# Patient Record
Sex: MALE | Race: WHITE | NOT HISPANIC OR LATINO | URBAN - METROPOLITAN AREA
[De-identification: names, ages, dates, MRNs, and addresses within clinical notes are randomized per-mention and may not be internally consistent; named-entity substitution may affect disease eponyms.]

---

## 2023-03-12 PROBLEM — E78.5 HYPERLIPEMIA: Status: ACTIVE | Noted: 2023-03-12

## 2023-03-12 PROBLEM — R35.0 URINARY FREQUENCY: Status: ACTIVE | Noted: 2023-03-12

## 2023-03-12 PROBLEM — R09.82 PND (POST-NASAL DRIP): Status: ACTIVE | Noted: 2023-03-12

## 2023-03-12 PROBLEM — F51.04 CHRONIC INSOMNIA: Status: ACTIVE | Noted: 2023-03-12

## 2023-03-12 PROBLEM — F41.1 GENERALIZED ANXIETY DISORDER: Status: ACTIVE | Noted: 2023-03-12

## 2023-03-12 PROBLEM — F32.9 MAJOR DEPRESSION: Status: ACTIVE | Noted: 2023-03-12

## 2023-03-12 PROBLEM — S43.439A SLAP SHOULDER TEAR: Status: ACTIVE | Noted: 2023-03-12

## 2023-03-12 PROBLEM — G47.33 OBSTRUCTIVE SLEEP APNEA: Status: ACTIVE | Noted: 2023-03-12

## 2023-03-12 PROBLEM — J06.9 URI (UPPER RESPIRATORY INFECTION): Status: ACTIVE | Noted: 2023-03-12

## 2023-03-12 PROBLEM — R03.0 ELEVATED BLOOD PRESSURE READING: Status: ACTIVE | Noted: 2023-03-12

## 2023-03-12 PROBLEM — R31.9 HEMATURIA: Status: ACTIVE | Noted: 2023-03-12

## 2023-03-12 RX ORDER — GABAPENTIN 300 MG/1
300 CAPSULE ORAL 3 TIMES DAILY
COMMUNITY
End: 2024-05-01 | Stop reason: ALTCHOICE

## 2023-03-12 RX ORDER — FINASTERIDE 1 MG/1
TABLET, FILM COATED ORAL
COMMUNITY

## 2023-03-12 RX ORDER — LORAZEPAM 1 MG/1
1 TABLET ORAL DAILY
COMMUNITY

## 2023-03-12 RX ORDER — ACETAMINOPHEN 500 MG
1 TABLET ORAL DAILY
COMMUNITY

## 2023-03-12 RX ORDER — MULTIVIT WITH MINERALS/HERBS
TABLET ORAL
COMMUNITY
End: 2024-05-01 | Stop reason: ALTCHOICE

## 2023-03-12 RX ORDER — CITALOPRAM 40 MG/1
1 TABLET, FILM COATED ORAL DAILY
COMMUNITY
Start: 2014-11-10

## 2023-03-23 ENCOUNTER — APPOINTMENT (OUTPATIENT)
Dept: PRIMARY CARE | Facility: CLINIC | Age: 63
End: 2023-03-23
Payer: COMMERCIAL

## 2023-03-30 ENCOUNTER — TELEPHONE (OUTPATIENT)
Dept: PRIMARY CARE | Facility: CLINIC | Age: 63
End: 2023-03-30
Payer: COMMERCIAL

## 2023-03-30 NOTE — TELEPHONE ENCOUNTER
Pt calling states you sent Adrenal Najera Kit and he wants to know if he should do the ASP schedule or the W-CAR schedule. Please advise

## 2023-05-05 ENCOUNTER — OFFICE VISIT (OUTPATIENT)
Dept: PRIMARY CARE | Facility: CLINIC | Age: 63
End: 2023-05-05
Payer: COMMERCIAL

## 2023-05-05 VITALS
WEIGHT: 203.8 LBS | DIASTOLIC BLOOD PRESSURE: 88 MMHG | HEART RATE: 88 BPM | BODY MASS INDEX: 29.18 KG/M2 | HEIGHT: 70 IN | OXYGEN SATURATION: 96 % | SYSTOLIC BLOOD PRESSURE: 124 MMHG

## 2023-05-05 DIAGNOSIS — F51.04 CHRONIC INSOMNIA: Primary | ICD-10-CM

## 2023-05-05 DIAGNOSIS — F41.1 GENERALIZED ANXIETY DISORDER: ICD-10-CM

## 2023-05-05 DIAGNOSIS — F32.1 CURRENT MODERATE EPISODE OF MAJOR DEPRESSIVE DISORDER, UNSPECIFIED WHETHER RECURRENT (MULTI): ICD-10-CM

## 2023-05-05 PROCEDURE — 99417 PROLNG OP E/M EACH 15 MIN: CPT | Performed by: FAMILY MEDICINE

## 2023-05-05 PROCEDURE — 99215 OFFICE O/P EST HI 40 MIN: CPT | Performed by: FAMILY MEDICINE

## 2023-05-05 RX ORDER — PAROXETINE HYDROCHLORIDE 20 MG/1
TABLET, FILM COATED ORAL
COMMUNITY
Start: 2023-04-27 | End: 2024-05-01 | Stop reason: ALTCHOICE
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
## 2024-05-01 ENCOUNTER — APPOINTMENT (OUTPATIENT)
Dept: LAB | Facility: LAB | Age: 64
End: 2024-05-01
Payer: COMMERCIAL

## 2024-05-01 ENCOUNTER — OFFICE VISIT (OUTPATIENT)
Dept: PRIMARY CARE | Facility: CLINIC | Age: 64
End: 2024-05-01
Payer: COMMERCIAL

## 2024-05-01 VITALS
OXYGEN SATURATION: 95 % | TEMPERATURE: 97.6 F | HEART RATE: 98 BPM | SYSTOLIC BLOOD PRESSURE: 110 MMHG | WEIGHT: 209 LBS | DIASTOLIC BLOOD PRESSURE: 80 MMHG | HEIGHT: 70 IN | BODY MASS INDEX: 29.92 KG/M2

## 2024-05-01 DIAGNOSIS — F41.1 GENERALIZED ANXIETY DISORDER: ICD-10-CM

## 2024-05-01 DIAGNOSIS — Z00.00 VISIT FOR PREVENTIVE HEALTH EXAMINATION: Primary | ICD-10-CM

## 2024-05-01 DIAGNOSIS — F32.1 CURRENT MODERATE EPISODE OF MAJOR DEPRESSIVE DISORDER, UNSPECIFIED WHETHER RECURRENT (MULTI): ICD-10-CM

## 2024-05-01 PROCEDURE — 1036F TOBACCO NON-USER: CPT | Performed by: FAMILY MEDICINE

## 2024-05-01 PROCEDURE — 99396 PREV VISIT EST AGE 40-64: CPT | Performed by: FAMILY MEDICINE

## 2024-05-01 RX ORDER — VENLAFAXINE HYDROCHLORIDE 150 MG/1
CAPSULE, EXTENDED RELEASE ORAL
COMMUNITY
Start: 2024-04-09

## 2024-05-01 RX ORDER — QUETIAPINE FUMARATE 50 MG/1
50 TABLET, FILM COATED ORAL NIGHTLY
COMMUNITY
Start: 2023-04-10

## 2024-05-01 RX ORDER — BISMUTH SUBSALICYLATE 262 MG
1 TABLET,CHEWABLE ORAL DAILY
COMMUNITY

## 2024-05-01 RX ORDER — LAMOTRIGINE 25 MG/1
25 TABLET ORAL
COMMUNITY

## 2024-05-01 RX ORDER — GABAPENTIN 400 MG/1
400 CAPSULE ORAL 3 TIMES DAILY
COMMUNITY

## 2024-05-01 NOTE — PROGRESS NOTES
Subjective   Patient ID: Sven Nava is a 63 y.o. male who presents for Annual Exam ([facility]).    Assessment/Plan     Problem List Items Addressed This Visit    None  Discussed about diet exercise  Discussed about age-related immunization  Discussed about fasting lab work  Follow-up every year for physical  Follow-up in a year come back early with any new signs and symptoms  Colon cancer screening colonoscopy information provided  PSA check today    Patient understood and agreed with plan.    HPI    63-year-old male here for physical    Chronic depression anxiety was admitted to inpatient hospital    Chronic insomnia      Father side family history of depression  Father was alcoholic    Quit smoking in 2008  Patient does not drink alcohol  No routine exercise    No new complaints    No Known Allergies    Current Outpatient Medications   Medication Sig Dispense Refill    cholecalciferol (Vitamin D-3) 50 mcg (2,000 unit) capsule Take 1 capsule (50 mcg) by mouth once daily.      finasteride (Propecia) 1 mg tablet Take by mouth.      gabapentin (Neurontin) 400 mg capsule Take 1 capsule (400 mg) by mouth 3 times a day.      multivitamin tablet Take 1 tablet by mouth once daily.      omega-3/dha/epa/fish oil (OMEGA-3 ORAL) Take by mouth. CAPS: Vegan formula      QUEtiapine (SEROquel) 50 mg tablet Take 1 tablet (50 mg) by mouth once daily at bedtime.      venlafaxine XR (Effexor-XR) 150 mg 24 hr capsule       citalopram (CeleXA) 40 mg tablet Take 1 tablet (40 mg) by mouth once daily.      LORazepam (Ativan) 1 mg tablet Take 1 tablet (1 mg) by mouth once daily.       No current facility-administered medications for this visit.       Objective   Visit Vitals  Smoking Status Former     Physical Exam  Constitutional   General appearance: Alert and in no acute distress.   Head and Face   Head and face: Normal.     Palpation of the face and sinuses: Normal.    Eyes   Inspection of eyes: Sclera and conjunctiva were  normal.    Pupil exam: Pupils were equal in size. Extraocular movements were intact.   Ears, Nose, Mouth, and Throat   Ears: Auricles: Normal.    Otoscopic examination: Tympanic membranes: Normal with no congestion and no discharge. Otic Canals: Normal without tenderness, congestion or discharge.    Hearing: Normal.     Nasal mucosa, septum, and turbinates: Normal without edema or erythema.    Lips, teeth, and gums: Normal.    Oropharynx: Normal with moist mucus membranes, no congestion. Tonsils: Normal no follicles.   Neck   Neck Exam: Appearance of the neck was normal. No neck masses observed.    Thyroid exam: Not enlarged and no palpable thyroid nodules.   Pulmonary   Respiratory assessment: No respiratory distress, normal respiratory rhythm and effort.    Auscultation of Lungs: Clear bilateral breath sounds.   Cardiovascular   Auscultation of heart: Apical pulse normal, heart rate and rhythm normal, normal S1 and S2, no murmurs and no pericardial rub.    Carotid arteries: Pulses normal with no bruits.    Exam for edema: No peripheral edema.   Chest   Chest: Normal A_P diameter, no pulsation, no intercostal withdrawing. Trachea central.   Abdomen   Abdominal Exam: No bruits, normal bowel sounds, soft, non-tender, no abdominal mass palpated.    Liver and Spleen exam: No hepato-splenomegaly.    Examination for hernias: Normal.    Lymphatic   Palpation of lymph nodes in neck: No cervical lymphadenopathy.   Musculoskeletal   Examination of gait: Normal.    Inspection of digits and nails: No clubbing or cyanosis of the fingernails.    Inspection/palpation of joints, bones and muscles: No joint swelling. Normal movement of all extremities.    Range of Motion: Normal movement of all extremities.   Skin   Skin inspection: Normal skin color and pigmentation, normal skin turgor and no visible rash.   Neurologic   Cranial nerves: Nerves 2-12 were intact, no focal neuro defects.    Reflexes: Normal.     Sensation: Normal.      Coordination: Normal.    Psychiatric   Judgment and insight: Intact.    Orientation: Oriented to person, place, and time.    Recent and remote memory: Normal.     Mood and affect: Normal.     Genitourinary Declined.    Immunization History   Administered Date(s) Administered    Influenza, seasonal, injectable 10/14/2022    Moderna SARS-CoV-2 Vaccination 03/19/2021, 04/16/2021, 12/13/2021, 04/19/2022       Review of Systems    No visits with results within 4 Month(s) from this visit.   Latest known visit with results is:   Legacy Encounter on 05/21/2021   Component Date Value Ref Range Status    DRUG SCREEN COMMENT URINE 05/21/2021 SEE BELOW   Final    Amphetamine Screen, Urine 05/21/2021 PRESUMPTIVE NEGATIVE  NEGATIVE Final    Barbiturate Screen, Urine 05/21/2021 PRESUMPTIVE NEGATIVE  NEGATIVE Final    BENZODIAZEPINE (PRESENCE) IN URINE* 05/21/2021 PRESUMPTIVE NEGATIVE  NEGATIVE Final    Cannabinoid Screen, Urine 05/21/2021 PRESUMPTIVE NEGATIVE  NEGATIVE Final    Cocaine Screen, Urine 05/21/2021 PRESUMPTIVE NEGATIVE  NEGATIVE Final    Fentanyl, Ur 05/21/2021 PRESUMPTIVE NEGATIVE  NEGATIVE Final    Methadone Screen, Urine 05/21/2021 PRESUMPTIVE NEGATIVE  NEGATIVE Final    Opiate Screen, Urine 05/21/2021 PRESUMPTIVE NEGATIVE  NEGATIVE Final    Oxycodone Screen, Ur 05/21/2021 PRESUMPTIVE NEGATIVE  NEGATIVE Final    PCP Screen, Urine 05/21/2021 PRESUMPTIVE NEGATIVE  NEGATIVE Final       Radiology: Reviewed imaging in powerchart.  No results found.    Family History   Problem Relation Name Age of Onset    Anxiety disorder Mother      Depression Mother      Anxiety disorder Father      Aortic aneurysm Father      Depression Father      Anxiety disorder Sister      Depression Sister      Anxiety disorder Brother      Depression Brother      Alcohol abuse Maternal Grandmother       Social History     Socioeconomic History    Marital status:      Spouse name: None    Number of children: None    Years of education:  None    Highest education level: None   Occupational History    None   Tobacco Use    Smoking status: Former     Types: Cigarettes    Smokeless tobacco: Never    Tobacco comments:     Social smoker for 20yrs 2008 quit   Substance and Sexual Activity    Alcohol use: Never     Comment: 2008 stopped drinking    Drug use: Never    Sexual activity: None   Other Topics Concern    None   Social History Narrative    None     Social Determinants of Health     Financial Resource Strain: Not on File (12/8/2022)    Received from [facility]     Financial Resource Strain     Financial Resource Strain: 0   Food Insecurity: Not on File (12/8/2022)    Received from [facility]     Food Insecurity     Food: 0   Transportation Needs: Not on File (12/8/2022)    Received from [facility]     Transportation Needs     Transportation: 0   Physical Activity: Not on File (12/8/2022)    Received from [facility]     Physical Activity     Physical Activity: 0   Recent Concern: Physical Activity - Insufficiently Active (11/28/2022)    Received from [facility]    Exercise Vital Sign     Days of Exercise per Week: 1 day     Minutes of Exercise per Session: 30 min   Stress: Not on File (12/8/2022)    Received from [facility]     Stress     Stress: 0   Recent Concern: Stress - Stress Concern Present (11/28/2022)    Received from [facility]    [employer] [place] [employer] - Occupational Stress Questionnaire     Feeling of Stress : Rather much   Social Connections: Not on File (12/8/2022)    Received from [facility]     Social Connections     Social Connections and Isolation: 0   Recent Concern: Social Connections - Moderately Isolated (11/28/2022)    Received from [facility]    Social Connection and Isolation Panel [NHANES]     Frequency of Communication with Friends and Family: Three times a week     Frequency of Social Gatherings with Friends and Family: Once a week     Attends [employer] Services: Never     Active Member of Clubs or Organizations: No     Attends  Club or Organization Meetings: Never     Marital Status:    Intimate Partner Violence: Not At Risk (2022)    Received from [facility]    Humiliation, Afraid, Rape, and Kick questionnaire     Fear of Current or Ex-Partner: No     Emotionally Abused: No     Physically Abused: No     Sexually Abused: No   Housing Stability: Not on File (2022)    Received from [facility]     Housing Stability     [place]     Past Medical History:   Diagnosis Date    Acute upper respiratory infection, unspecified 2020    URI (upper respiratory infection)    Alcohol abuse, uncomplicated     Alcohol abuse    Elevated blood-pressure reading, without diagnosis of hypertension 2021    Elevated blood pressure reading    Encounter for other administrative examinations 2021    Medication management contract agreement    Encounter for screening for malignant neoplasm of colon 2020    Colon cancer screening    Hyperlipidemia, unspecified 2020    Hyperlipemia    Obstructive sleep apnea (adult) (pediatric) 2021    Obstructive sleep apnea    Other long term (current) drug therapy 2021    Medication management    Superior glenoid labrum lesion of unspecified shoulder, initial encounter 10/22/2020    SLAP shoulder tear     History reviewed. No pertinent surgical history.    Charting was completed using voice recognition technology and may include unintended errors.

## 2024-06-12 ENCOUNTER — APPOINTMENT (OUTPATIENT)
Dept: LAB | Facility: LAB | Age: 64
End: 2024-06-12
Payer: COMMERCIAL

## 2024-06-12 ENCOUNTER — LAB (OUTPATIENT)
Dept: LAB | Facility: LAB | Age: 64
End: 2024-06-12
Payer: COMMERCIAL

## 2024-06-12 DIAGNOSIS — Z00.00 VISIT FOR PREVENTIVE HEALTH EXAMINATION: ICD-10-CM

## 2024-06-12 LAB
ALBUMIN SERPL BCP-MCNC: 4.3 G/DL (ref 3.4–5)
ALP SERPL-CCNC: 88 U/L (ref 33–136)
ALT SERPL W P-5'-P-CCNC: 22 U/L (ref 10–52)
ANION GAP SERPL CALC-SCNC: 13 MMOL/L (ref 10–20)
APPEARANCE UR: CLEAR
AST SERPL W P-5'-P-CCNC: 16 U/L (ref 9–39)
BILIRUB SERPL-MCNC: 0.4 MG/DL (ref 0–1.2)
BILIRUB UR STRIP.AUTO-MCNC: NEGATIVE MG/DL
BUN SERPL-MCNC: 11 MG/DL (ref 6–23)
CALCIUM SERPL-MCNC: 9.1 MG/DL (ref 8.6–10.6)
CHLORIDE SERPL-SCNC: 106 MMOL/L (ref 98–107)
CHOLEST SERPL-MCNC: 144 MG/DL (ref 0–199)
CHOLESTEROL/HDL RATIO: 3.2
CO2 SERPL-SCNC: 29 MMOL/L (ref 21–32)
COLOR UR: YELLOW
CREAT SERPL-MCNC: 1.13 MG/DL (ref 0.5–1.3)
EGFRCR SERPLBLD CKD-EPI 2021: 74 ML/MIN/1.73M*2
ERYTHROCYTE [DISTWIDTH] IN BLOOD BY AUTOMATED COUNT: 11.7 % (ref 11.5–14.5)
GLUCOSE SERPL-MCNC: 96 MG/DL (ref 74–99)
GLUCOSE UR STRIP.AUTO-MCNC: NORMAL MG/DL
HCT VFR BLD AUTO: 49.3 % (ref 41–52)
HDLC SERPL-MCNC: 44.6 MG/DL
HGB BLD-MCNC: 16.8 G/DL (ref 13.5–17.5)
KETONES UR STRIP.AUTO-MCNC: NEGATIVE MG/DL
LDLC SERPL CALC-MCNC: 64 MG/DL
LEUKOCYTE ESTERASE UR QL STRIP.AUTO: NEGATIVE
MCH RBC QN AUTO: 32.3 PG (ref 26–34)
MCHC RBC AUTO-ENTMCNC: 34.1 G/DL (ref 32–36)
MCV RBC AUTO: 95 FL (ref 80–100)
MUCOUS THREADS #/AREA URNS AUTO: NORMAL /LPF
NITRITE UR QL STRIP.AUTO: NEGATIVE
NON HDL CHOLESTEROL: 99 MG/DL (ref 0–149)
NRBC BLD-RTO: 0 /100 WBCS (ref 0–0)
PH UR STRIP.AUTO: 5.5 [PH]
PLATELET # BLD AUTO: 238 X10*3/UL (ref 150–450)
POTASSIUM SERPL-SCNC: 3.9 MMOL/L (ref 3.5–5.3)
PROT SERPL-MCNC: 6.3 G/DL (ref 6.4–8.2)
PROT UR STRIP.AUTO-MCNC: NORMAL MG/DL
PSA SERPL-MCNC: 0.28 NG/ML
RBC # BLD AUTO: 5.2 X10*6/UL (ref 4.5–5.9)
RBC # UR STRIP.AUTO: NEGATIVE /UL
RBC #/AREA URNS AUTO: NORMAL /HPF
SODIUM SERPL-SCNC: 144 MMOL/L (ref 136–145)
SP GR UR STRIP.AUTO: 1.03
T4 FREE SERPL-MCNC: 0.72 NG/DL (ref 0.78–1.48)
TRIGL SERPL-MCNC: 175 MG/DL (ref 0–149)
TSH SERPL-ACNC: 4.73 MIU/L (ref 0.44–3.98)
URATE CRY #/AREA UR COMP ASSIST: NORMAL /HPF
UROBILINOGEN UR STRIP.AUTO-MCNC: NORMAL MG/DL
VLDL: 35 MG/DL (ref 0–40)
WBC # BLD AUTO: 4.2 X10*3/UL (ref 4.4–11.3)
WBC #/AREA URNS AUTO: NORMAL /HPF

## 2024-06-12 PROCEDURE — 81001 URINALYSIS AUTO W/SCOPE: CPT

## 2024-06-12 PROCEDURE — 84439 ASSAY OF FREE THYROXINE: CPT

## 2024-06-12 PROCEDURE — 84443 ASSAY THYROID STIM HORMONE: CPT

## 2024-06-12 PROCEDURE — 84153 ASSAY OF PSA TOTAL: CPT

## 2024-06-12 PROCEDURE — 36415 COLL VENOUS BLD VENIPUNCTURE: CPT

## 2024-06-12 PROCEDURE — 80061 LIPID PANEL: CPT

## 2024-06-12 PROCEDURE — 85027 COMPLETE CBC AUTOMATED: CPT

## 2024-06-12 PROCEDURE — 80053 COMPREHEN METABOLIC PANEL: CPT

## 2024-06-24 ENCOUNTER — TELEPHONE (OUTPATIENT)
Dept: SCHEDULING | Age: 64
End: 2024-06-24
Payer: COMMERCIAL

## 2024-06-25 NOTE — TELEPHONE ENCOUNTER
Please inform patient that lab work within normal limits.  As long as patient can see normal lab work we do not call.  We only call with abnormal results where we need to change any management plan.  And please give him GI information for Dr. Meng

## 2024-07-11 ENCOUNTER — APPOINTMENT (OUTPATIENT)
Dept: BEHAVIORAL HEALTH | Facility: CLINIC | Age: 64
End: 2024-07-11
Payer: COMMERCIAL

## 2024-07-11 VITALS
WEIGHT: 199 LBS | HEIGHT: 71 IN | HEART RATE: 92 BPM | DIASTOLIC BLOOD PRESSURE: 87 MMHG | SYSTOLIC BLOOD PRESSURE: 127 MMHG | BODY MASS INDEX: 27.86 KG/M2

## 2024-07-11 DIAGNOSIS — F32.1 CURRENT MODERATE EPISODE OF MAJOR DEPRESSIVE DISORDER, UNSPECIFIED WHETHER RECURRENT (MULTI): ICD-10-CM

## 2024-07-11 DIAGNOSIS — F41.1 GENERALIZED ANXIETY DISORDER: ICD-10-CM

## 2024-07-11 PROCEDURE — 90792 PSYCH DIAG EVAL W/MED SRVCS: CPT | Performed by: PSYCHIATRY & NEUROLOGY

## 2024-07-11 ASSESSMENT — ENCOUNTER SYMPTOMS
DIARRHEA: 0
SLEEP DISTURBANCE: 1
AGITATION: 0
CONSTIPATION: 1
DECREASED CONCENTRATION: 0
NERVOUS/ANXIOUS: 1
HALLUCINATIONS: 0
NAUSEA: 0
HYPERACTIVE: 0
VOMITING: 0
SEIZURES: 0
DYSPHORIC MOOD: 0
APPETITE CHANGE: 0
FATIGUE: 0
CONFUSION: 0
PALPITATIONS: 0
TREMORS: 0

## 2024-07-11 NOTE — PROGRESS NOTES
"Subjective   Patient ID: Sven Nava is a 63 y.o. male who presents for second opinion  Diagnosed with OK since 1995   Hospitalized in 2008 for severe anxiety and sleep issues on Celexa 40 mg until his most recent hospitalization in November 2022  Few months ago, diagnosis was switched to bipolar disorder. Currently on 100 mg of Lamictal since 6/18/2024, Seroquel was reduced from 300 to 150 with the intent to discontinue   Still on Venlafaxine 150 and Gabapentin 400 mg for anxiety   Also on Lorazepam 1 mg po at bedtime prn, does not take is as much   In therapy now   Episodes of impulsive behavior, what he described as \" grandiosity\", chronic lack of sleep and attention. Maintained the same job and marriage for 30 years or so.   History of excessive alcohol use until 2008        Current Outpatient Medications on File Prior to Visit   Medication Sig Dispense Refill    cholecalciferol (Vitamin D-3) 50 mcg (2,000 unit) capsule Take 1 capsule (50 mcg) by mouth once daily.      finasteride (Propecia) 1 mg tablet Take by mouth.      gabapentin (Neurontin) 400 mg capsule Take 1 capsule (400 mg) by mouth 3 times a day.      lamoTRIgine (LaMICtal) 25 mg tablet Take 4 tablets (100 mg) by mouth every other day. 1 tab for 14days then 2 tabs for 14 days      LORazepam (Ativan) 1 mg tablet Take 1 tablet (1 mg) by mouth once daily as needed for anxiety.      multivitamin tablet Take 1 tablet by mouth once daily.      omega-3/dha/epa/fish oil (OMEGA-3 ORAL) Take by mouth. CAPS: Vegan formula      QUEtiapine (SEROquel) 50 mg tablet Take 3 tablets (150 mg) by mouth once daily at bedtime.      venlafaxine XR (Effexor-XR) 150 mg 24 hr capsule       citalopram (CeleXA) 40 mg tablet Take 1 tablet (40 mg) by mouth once daily.       No current facility-administered medications on file prior to visit.      Patient Active Problem List   Diagnosis    Elevated blood pressure reading    Generalized anxiety disorder    Hyperlipemia    " Obstructive sleep apnea    PND (post-nasal drip)    SLAP shoulder tear    URI (upper respiratory infection)    Chronic insomnia    Hematuria    Major depression    Urinary frequency    Visit for preventive health examination        Past Psychiatric History:  Gabapentin, Seroquel, Celexa, Venlafaxine, Xanax   2 inpatient admissions   No history of suicide attempt of suicide ideation   Substance Abuse History:  History of excessive use of alcoholism, quit in 2008   Family History:  Family History   Problem Relation Name Age of Onset    Anxiety disorder Mother      Depression Mother      Anxiety disorder Father      Aortic aneurysm Father      Depression Father      Anxiety disorder Sister      Depression Sister      Anxiety disorder Brother      Depression Brother      Alcohol abuse Maternal Grandmother      Father`s side there is history of depression   Great uncle had schizophrenia   Social History:  Social History     Socioeconomic History    Marital status:      Spouse name: Not on file    Number of children: Not on file    Years of education: Not on file    Highest education level: Not on file   Occupational History    Not on file   Tobacco Use    Smoking status: Former     Types: Cigarettes    Smokeless tobacco: Never    Tobacco comments:     Social smoker for 20yrs 2008 quit   Substance and Sexual Activity    Alcohol use: Never     Comment: 2008 stopped drinking    Drug use: Never    Sexual activity: Not on file   Other Topics Concern    Not on file   Social History Narrative    Not on file     Social Determinants of Health     Financial Resource Strain: Not on File (12/8/2022)    Received from [facility]    Financial Resource Strain     Financial Resource Strain: 0   Food Insecurity: Not on File (12/8/2022)    Received from [facility]    Food Insecurity     Food: 0   Transportation Needs: Not on File (12/8/2022)    Received from [facility]    Transportation Needs     Transportation: 0   Physical  Activity: Not on File (2022)    Received from [facility]    Physical Activity     Physical Activity: 0   Recent Concern: Physical Activity - Insufficiently Active (2022)    Received from [employer]    Exercise Vital Sign     Days of Exercise per Week: 1 day     Minutes of Exercise per Session: 30 min   Stress: Not on File (2022)    Received from [facility]    Stress     Stress: 0   Recent Concern: Stress - Stress Concern Present (2022)    Received from [employer]    [facility] of Occupational Health - Occupational Stress Questionnaire     Feeling of Stress : Rather much   Social Connections: Not on File (2022)    Received from [facility]    Social Connections     Social Connections and Isolation: 0   Recent Concern: Social Connections - Moderately Isolated (2022)    Received from [employer]    Social Connection and Isolation Panel [NHANES]     Frequency of Communication with Friends and Family: Three times a week     Frequency of Social Gatherings with Friends and Family: Once a week     Attends [employer] Services: Never     Active Member of Clubs or Organizations: No     Attends Club or Organization Meetings: Never     Marital Status:    Intimate Partner Violence: Not At Risk (2022)    Received from [employer]    Humiliation, Afraid, Rape, and Kick questionnaire     Fear of Current or Ex-Partner: No     Emotionally Abused: No     Physically Abused: No     Sexually Abused: No   Housing Stability: Not on File (2022)    Received from [facility]    Housing Stability     [place]          Born here, raised by strict father  [employer]   4 children    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   Cardiovascular:  Negative for chest pain and palpitations.   Gastrointestinal:  Positive for constipation. Negative for  diarrhea, nausea and vomiting.   Musculoskeletal:  Negative for gait problem.   Skin:  Negative for rash.   Neurological:  Negative for tremors and seizures.   Psychiatric/Behavioral:  Positive for sleep disturbance. Negative for agitation, behavioral problems, confusion, decreased concentration, dysphoric mood, hallucinations, self-injury and suicidal ideas. The patient is nervous/anxious. The patient is not hyperactive.        Objective   There were no vitals filed for this visit.   Physical Exam  Psychiatric:         Attention and Perception: Attention normal.         Mood and Affect: Mood and affect normal.         Speech: Speech normal.         Behavior: Behavior normal. Behavior is cooperative.         Thought Content: Thought content normal.         Cognition and Memory: Cognition and memory normal.         Lab Review:   Lab on 06/12/2024   Component Date Value    Prostate Specific Antige* 06/12/2024 0.28     Thyroid Stimulating Horm* 06/12/2024 4.73 (H)     Cholesterol 06/12/2024 144     HDL-Cholesterol 06/12/2024 44.6     Cholesterol/HDL Ratio 06/12/2024 3.2     LDL Calculated 06/12/2024 64     VLDL 06/12/2024 35     Triglycerides 06/12/2024 175 (H)     Non HDL Cholesterol 06/12/2024 99     Glucose 06/12/2024 96     Sodium 06/12/2024 144     Potassium 06/12/2024 3.9     Chloride 06/12/2024 106     Bicarbonate 06/12/2024 29     Anion Gap 06/12/2024 13     Urea Nitrogen 06/12/2024 11     Creatinine 06/12/2024 1.13     eGFR 06/12/2024 74     Calcium 06/12/2024 9.1     Albumin 06/12/2024 4.3     Alkaline Phosphatase 06/12/2024 88     Total Protein 06/12/2024 6.3 (L)     AST 06/12/2024 16     Bilirubin, Total 06/12/2024 0.4     ALT 06/12/2024 22     WBC 06/12/2024 4.2 (L)     nRBC 06/12/2024 0.0     RBC 06/12/2024 5.20     Hemoglobin 06/12/2024 16.8     Hematocrit 06/12/2024 49.3     MCV 06/12/2024 95     MCH 06/12/2024 32.3     MCHC 06/12/2024 34.1     RDW 06/12/2024 11.7     Platelets 06/12/2024 238     Color,  Urine 06/12/2024 Yellow     Appearance, Urine 06/12/2024 Clear     Specific Gravity, Urine 06/12/2024 1.026     pH, Urine 06/12/2024 5.5     Protein, Urine 06/12/2024 10 (TRACE)     Glucose, Urine 06/12/2024 Normal     Blood, Urine 06/12/2024 NEGATIVE     Ketones, Urine 06/12/2024 NEGATIVE     Bilirubin, Urine 06/12/2024 NEGATIVE     Urobilinogen, Urine 06/12/2024 Normal     Nitrite, Urine 06/12/2024 NEGATIVE     Leukocyte Esterase, Urine 06/12/2024 NEGATIVE     Thyroxine, Free 06/12/2024 0.72 (L)     WBC, Urine 06/12/2024 1-5     RBC, Urine 06/12/2024 3-5     Mucus, Urine 06/12/2024 FEW     Uric Acid Crystals, Urine 06/12/2024 1+      Lab Results   Component Value Date     06/12/2024     09/21/2020    K 3.9 06/12/2024    K 4.4 09/21/2020    CO2 29 06/12/2024    CO2 29 09/21/2020    BUN 11 06/12/2024    BUN 14 09/21/2020    CREATININE 1.13 06/12/2024    CREATININE 0.98 09/21/2020    GLUCOSE 96 06/12/2024    GLUCOSE 87 09/21/2020    CALCIUM 9.1 06/12/2024    CALCIUM 9.5 09/21/2020     Lab Results   Component Value Date    WBC 4.2 (L) 06/12/2024    HGB 16.8 06/12/2024    HCT 49.3 06/12/2024    MCV 95 06/12/2024     06/12/2024     Lab Results   Component Value Date    CHOL 144 06/12/2024    TRIG 175 (H) 06/12/2024    HDL 44.6 06/12/2024       Assessment/Plan   Problem List Items Addressed This Visit       Generalized anxiety disorder    Major depression     R/o bipolar d/o   Would continue Venlafaxine and Lamictal  Would stop Ativan and Gabapentin   Continue to taper Seroquel and eventually discontinue   Mood charting   Therapy   Discussed the diagnoses, discussed meds benefits, alternatives and side effects     Dejah Palma MD

## 2025-01-10 ENCOUNTER — APPOINTMENT (OUTPATIENT)
Dept: PRIMARY CARE | Facility: CLINIC | Age: 65
End: 2025-01-10
Payer: COMMERCIAL

## 2025-01-10 VITALS
HEART RATE: 96 BPM | HEIGHT: 71 IN | WEIGHT: 203 LBS | OXYGEN SATURATION: 96 % | BODY MASS INDEX: 28.42 KG/M2 | SYSTOLIC BLOOD PRESSURE: 130 MMHG | DIASTOLIC BLOOD PRESSURE: 90 MMHG | TEMPERATURE: 97.9 F

## 2025-01-10 DIAGNOSIS — R22.1 LOCALIZED SWELLING, MASS AND LUMP, NECK: Primary | ICD-10-CM

## 2025-01-10 PROCEDURE — 99213 OFFICE O/P EST LOW 20 MIN: CPT | Performed by: FAMILY MEDICINE

## 2025-01-10 PROCEDURE — 1036F TOBACCO NON-USER: CPT | Performed by: FAMILY MEDICINE

## 2025-01-10 PROCEDURE — 3008F BODY MASS INDEX DOCD: CPT | Performed by: FAMILY MEDICINE

## 2025-01-10 RX ORDER — LAMOTRIGINE 100 MG/1
150 TABLET ORAL
COMMUNITY
Start: 2024-08-12

## 2025-01-10 NOTE — PROGRESS NOTES
Subjective   Patient ID: Sven Nava is a 64 y.o. male who presents for No chief complaint on file..    Assessment/Plan     Problem List Items Addressed This Visit    None  Discussed about diet exercise  Discussed about age-related immunization  Discussed about fasting lab work  Follow-up every year for physical  Follow-up in a year come back early with any new signs and symptoms  Colon cancer screening colonoscopy information provided  PSA check today    Patient understood and agreed with plan.    HPI    64-year-old male here for complaining of lump on right posterolateral aspect of neck  Nontender noticed probably few days to couple of weeks ago by chiropractor  Advised to see GP  Currently nontender  Overlying skin within normal limits 3 to 4 cm diameter could be severe muscle spasm as  patient have severe anxiety with neck spasm      Previous visit  Chronic depression anxiety was admitted to inpatient hospital    Chronic insomnia      Father side family history of depression  Father was alcoholic    Quit smoking in 2008  Patient does not drink alcohol  No routine exercise    No new complaints    No Known Allergies    Current Outpatient Medications   Medication Sig Dispense Refill    cholecalciferol (Vitamin D-3) 50 mcg (2,000 unit) capsule Take 1 capsule (50 mcg) by mouth once daily.      citalopram (CeleXA) 40 mg tablet Take 1 tablet (40 mg) by mouth once daily.      finasteride (Propecia) 1 mg tablet Take by mouth.      gabapentin (Neurontin) 400 mg capsule Take 1 capsule (400 mg) by mouth 3 times a day.      lamoTRIgine (LaMICtal) 25 mg tablet Take 4 tablets (100 mg) by mouth every other day. 1 tab for 14days then 2 tabs for 14 days      LORazepam (Ativan) 1 mg tablet Take 1 tablet (1 mg) by mouth once daily as needed for anxiety.      multivitamin tablet Take 1 tablet by mouth once daily.      omega-3/dha/epa/fish oil (OMEGA-3 ORAL) Take by mouth. CAPS: Vegan formula      QUEtiapine (SEROquel) 50 mg  tablet Take 3 tablets (150 mg) by mouth once daily at bedtime.      venlafaxine XR (Effexor-XR) 150 mg 24 hr capsule        No current facility-administered medications for this visit.       Objective   Visit Vitals  Smoking Status Former     Physical Exam  Constitutional   General appearance: Alert and in no acute distress.   Head and Face   Head and face: Normal.     Palpation of the face and sinuses: Normal.    Eyes   Inspection of eyes: Sclera and conjunctiva were normal.    Pupil exam: Pupils were equal in size. Extraocular movements were intact.   Ears, Nose, Mouth, and Throat   Ears: Auricles: Normal.    Otoscopic examination: Tympanic membranes: Normal with no congestion and no discharge. Otic Canals: Normal without tenderness, congestion or discharge.    Hearing: Normal.     Nasal mucosa, septum, and turbinates: Normal without edema or erythema.    Lips, teeth, and gums: Normal.    Oropharynx: Normal with moist mucus membranes, no congestion. Tonsils: Normal no follicles.   Neck   Neck Exam: Appearance of the neck was normal. No neck masses observed.    Thyroid exam: Not enlarged and no palpable thyroid nodules.   Pulmonary   Respiratory assessment: No respiratory distress, normal respiratory rhythm and effort.    Auscultation of Lungs: Clear bilateral breath sounds.   Cardiovascular   Auscultation of heart: Apical pulse normal, heart rate and rhythm normal, normal S1 and S2, no murmurs and no pericardial rub.    Carotid arteries: Pulses normal with no bruits.    Exam for edema: No peripheral edema.   Chest   Chest: Normal A_P diameter, no pulsation, no intercostal withdrawing. Trachea central.   Abdomen   Abdominal Exam: No bruits, normal bowel sounds, soft, non-tender, no abdominal mass palpated.    Liver and Spleen exam: No hepato-splenomegaly.    Examination for hernias: Normal.    Lymphatic   Palpation of lymph nodes in neck: No cervical lymphadenopathy.   Musculoskeletal   Examination of gait: Normal.     Inspection of digits and nails: No clubbing or cyanosis of the fingernails.    Inspection/palpation of joints, bones and muscles: No joint swelling. Normal movement of all extremities.    Range of Motion: Normal movement of all extremities.   Skin   Skin inspection: Normal skin color and pigmentation, normal skin turgor and no visible rash.   Neurologic   Cranial nerves: Nerves 2-12 were intact, no focal neuro defects.    Reflexes: Normal.     Sensation: Normal.     Coordination: Normal.    Psychiatric   Judgment and insight: Intact.    Orientation: Oriented to person, place, and time.    Recent and remote memory: Normal.     Mood and affect: Normal.     Genitourinary Declined.    Immunization History   Administered Date(s) Administered    Flu vaccine (IIV4), preservative free *Check age/dose* 12/26/2016, 10/14/2022    Flu vaccine, trivalent, preservative free, no egg protein, age 6 months or greater (Flucelvax) 01/19/2016    Influenza, Unspecified 01/17/2013    Influenza, injectable, quadrivalent 11/27/2017    Influenza, seasonal, injectable 10/14/2022    Moderna SARS-CoV-2 Vaccination 03/19/2021, 04/16/2021, 12/13/2021, 04/19/2022    Tdap vaccine, age 7 year and older (BOOSTRIX, ADACEL) 03/17/2010       Review of Systems    No visits with results within 4 Month(s) from this visit.   Latest known visit with results is:   Lab on 06/12/2024   Component Date Value Ref Range Status    Prostate Specific Antigen,Screen 06/12/2024 0.28  <=4.00 ng/mL Final    Thyroid Stimulating Hormone 06/12/2024 4.73 (H)  0.44 - 3.98 mIU/L Final    Cholesterol 06/12/2024 144  0 - 199 mg/dL Final    HDL-Cholesterol 06/12/2024 44.6  mg/dL Final    Cholesterol/HDL Ratio 06/12/2024 3.2   Final    LDL Calculated 06/12/2024 64  <=99 mg/dL Final    VLDL 06/12/2024 35  0 - 40 mg/dL Final    Triglycerides 06/12/2024 175 (H)  0 - 149 mg/dL Final    Non HDL Cholesterol 06/12/2024 99  0 - 149 mg/dL Final    Glucose 06/12/2024 96  74 - 99 mg/dL  Final    Sodium 06/12/2024 144  136 - 145 mmol/L Final    Potassium 06/12/2024 3.9  3.5 - 5.3 mmol/L Final    Chloride 06/12/2024 106  98 - 107 mmol/L Final    Bicarbonate 06/12/2024 29  21 - 32 mmol/L Final    Anion Gap 06/12/2024 13  10 - 20 mmol/L Final    Urea Nitrogen 06/12/2024 11  6 - 23 mg/dL Final    Creatinine 06/12/2024 1.13  0.50 - 1.30 mg/dL Final    eGFR 06/12/2024 74  >60 mL/min/1.73m*2 Final    Calcium 06/12/2024 9.1  8.6 - 10.6 mg/dL Final    Albumin 06/12/2024 4.3  3.4 - 5.0 g/dL Final    Alkaline Phosphatase 06/12/2024 88  33 - 136 U/L Final    Total Protein 06/12/2024 6.3 (L)  6.4 - 8.2 g/dL Final    AST 06/12/2024 16  9 - 39 U/L Final    Bilirubin, Total 06/12/2024 0.4  0.0 - 1.2 mg/dL Final    ALT 06/12/2024 22  10 - 52 U/L Final    WBC 06/12/2024 4.2 (L)  4.4 - 11.3 x10*3/uL Final    nRBC 06/12/2024 0.0  0.0 - 0.0 /100 WBCs Final    RBC 06/12/2024 5.20  4.50 - 5.90 x10*6/uL Final    Hemoglobin 06/12/2024 16.8  13.5 - 17.5 g/dL Final    Hematocrit 06/12/2024 49.3  41.0 - 52.0 % Final    MCV 06/12/2024 95  80 - 100 fL Final    MCH 06/12/2024 32.3  26.0 - 34.0 pg Final    MCHC 06/12/2024 34.1  32.0 - 36.0 g/dL Final    RDW 06/12/2024 11.7  11.5 - 14.5 % Final    Platelets 06/12/2024 238  150 - 450 x10*3/uL Final    Color, Urine 06/12/2024 Yellow  Light-Yellow, Yellow, Dark-Yellow Final    Appearance, Urine 06/12/2024 Clear  Clear Final    Specific Gravity, Urine 06/12/2024 1.026  1.005 - 1.035 Final    pH, Urine 06/12/2024 5.5  5.0, 5.5, 6.0, 6.5, 7.0, 7.5, 8.0 Final    Protein, Urine 06/12/2024 10 (TRACE)  NEGATIVE, 10 (TRACE), 20 (TRACE) mg/dL Final    Glucose, Urine 06/12/2024 Normal  Normal mg/dL Final    Blood, Urine 06/12/2024 NEGATIVE  NEGATIVE Final    Ketones, Urine 06/12/2024 NEGATIVE  NEGATIVE mg/dL Final    Bilirubin, Urine 06/12/2024 NEGATIVE  NEGATIVE Final    Urobilinogen, Urine 06/12/2024 Normal  Normal mg/dL Final    Nitrite, Urine 06/12/2024 NEGATIVE  NEGATIVE Final    Leukocyte  Esterase, Urine 06/12/2024 NEGATIVE  NEGATIVE Final    Thyroxine, Free 06/12/2024 0.72 (L)  0.78 - 1.48 ng/dL Final    WBC, Urine 06/12/2024 1-5  1-5, NONE /HPF Final    RBC, Urine 06/12/2024 3-5  NONE, 1-2, 3-5 /HPF Final    Mucus, Urine 06/12/2024 FEW  Reference range not established. /LPF Final    Uric Acid Crystals, Urine 06/12/2024 1+  NONE, 1+ /HPF Final       Radiology: Reviewed imaging in powerchart.  No results found.    Family History   Problem Relation Name Age of Onset    Anxiety disorder Mother      Depression Mother      Anxiety disorder Father      Aortic aneurysm Father      Depression Father      Anxiety disorder Sister      Depression Sister      Anxiety disorder Brother      Depression Brother      Alcohol abuse Maternal Grandmother       Social History     Socioeconomic History    Marital status:    Tobacco Use    Smoking status: Former     Types: Cigarettes    Smokeless tobacco: Never    Tobacco comments:     Social smoker for 20yrs 2008 quit   Substance and Sexual Activity    Alcohol use: Never     Comment: 2008 stopped drinking    Drug use: Never     Social Drivers of Health     Financial Resource Strain: Not on File (12/8/2022)    Received from [facility]    Financial Resource Strain     Financial Resource Strain: 0   Food Insecurity: Not on File (9/26/2024)    Received from [facility] [employer]    Food Insecurity     Food: 0   Transportation Needs: Not on File (12/8/2022)    Received from [facility]    Transportation Needs     Transportation: 0   Physical Activity: Not on File (12/8/2022)    Received from [facility]    Physical Activity     Physical Activity: 0   Recent Concern: Physical Activity - Insufficiently Active (11/28/2022)    Received from [employer]    Exercise Vital Sign     Days of Exercise per Week: 1 day     Minutes of Exercise per Session: 30 min   Stress: Not on File (12/8/2022)    Received from [facility]    Stress     Stress: 0   Recent Concern: Stress -  Stress Concern Present (2022)    Received from [employer] [employer]    [facility] of Occupational Health - Occupational Stress Questionnaire     Feeling of Stress : Rather much   Social Connections: Not on File (2024)    Received from [employer]    Social Connections     Connectedness: 0   Intimate Partner Violence: Not At Risk (2022)    Received from [employer] [employer]    Humiliation, Afraid, Rape, and Kick questionnaire     Fear of Current or Ex-Partner: No     Emotionally Abused: No     Physically Abused: No     Sexually Abused: No   Housing Stability: Not on File (2022)    Received from [employer] [email or web address]    Housing Stability     [place]     Past Medical History:   Diagnosis Date    Acute upper respiratory infection, unspecified 2020    URI (upper respiratory infection)    Alcohol abuse, uncomplicated     Alcohol abuse    Elevated blood-pressure reading, without diagnosis of hypertension 2021    Elevated blood pressure reading    Encounter for other administrative examinations 2021    Medication management contract agreement    Encounter for screening for malignant neoplasm of colon 2020    Colon cancer screening    Hyperlipidemia, unspecified 2020    Hyperlipemia    Obstructive sleep apnea (adult) (pediatric) 2021    Obstructive sleep apnea    Other long term (current) drug therapy 2021    Medication management    Superior glenoid labrum lesion of unspecified shoulder, initial encounter 10/22/2020    SLAP shoulder tear     No past surgical history on file.    Charting was completed using voice recognition technology and may include unintended errors.

## 2025-01-15 ENCOUNTER — APPOINTMENT (OUTPATIENT)
Dept: RADIOLOGY | Facility: CLINIC | Age: 65
End: 2025-01-15
Payer: COMMERCIAL

## 2025-01-22 ENCOUNTER — APPOINTMENT (OUTPATIENT)
Dept: RADIOLOGY | Facility: CLINIC | Age: 65
End: 2025-01-22
Payer: COMMERCIAL